# Patient Record
(demographics unavailable — no encounter records)

---

## 2024-10-22 NOTE — HISTORY OF PRESENT ILLNESS
[Born at ___ Wks Gestation] : The patient was born at [unfilled] weeks gestation [] : via normal spontaneous vaginal delivery [[facility]] : at [facility] [BW: _____] : weight of [unfilled] [Length: _____] : length of [unfilled] [HC: _____] : head circumference of [unfilled] [DW: _____] : Discharge weight was [unfilled] [Age: ___] : [unfilled] year old mother [G: ___] : G [unfilled] [P: ___] : P [unfilled] [Yes] : Yes [Breast milk] : breast milk [Hepatitis B Vaccine Given] : Hepatitis B vaccine given [Nirsevimab Given] : Nirsevimab given  [(1) _____] : [unfilled] [(5) _____] : [unfilled] [Meconium] : meconium [Rubella (Immune)] : Rubella immune [MBT: ____] : MBT - [unfilled] [No] : No cigarette smoke exposure [Carbon Monoxide Detectors] : Carbon monoxide detectors at home [Smoke Detectors] : Smoke detectors at home. [HepBsAG] : HepBsAg negative [HIV] : HIV negative [HepC] : Hepatitis C negative [GBS] : GBS negative [VDRL/RPR (Reactive)] : VDRL/RPR nonreactive [] : negative [FreeTextEntry8] : RIGHT HIP CLICK ON EXAM [Exposure to electronic nicotine delivery system] : No exposure to electronic nicotine delivery system [FreeTextEntry7] : Mother Tea Age 33 y/o () Father Homer Age 34 y/o (Real estate) [FreeTextEntry9] : HOME [de-identified] : Hep B 10/18/2024 Nirsevimab 10/20/2024

## 2024-10-22 NOTE — HISTORY OF PRESENT ILLNESS
[Born at ___ Wks Gestation] : The patient was born at [unfilled] weeks gestation [] : via normal spontaneous vaginal delivery [[facility]] : at [facility] [BW: _____] : weight of [unfilled] [Length: _____] : length of [unfilled] [HC: _____] : head circumference of [unfilled] [DW: _____] : Discharge weight was [unfilled] [Age: ___] : [unfilled] year old mother [G: ___] : G [unfilled] [P: ___] : P [unfilled] [Yes] : Yes [Breast milk] : breast milk [Hepatitis B Vaccine Given] : Hepatitis B vaccine given [Nirsevimab Given] : Nirsevimab given  [(1) _____] : [unfilled] [(5) _____] : [unfilled] [Meconium] : meconium [Rubella (Immune)] : Rubella immune [MBT: ____] : MBT - [unfilled] [No] : No cigarette smoke exposure [Carbon Monoxide Detectors] : Carbon monoxide detectors at home [Smoke Detectors] : Smoke detectors at home. [HepBsAG] : HepBsAg negative [HIV] : HIV negative [HepC] : Hepatitis C negative [GBS] : GBS negative [VDRL/RPR (Reactive)] : VDRL/RPR nonreactive [] : negative [FreeTextEntry8] : RIGHT HIP CLICK ON EXAM [Exposure to electronic nicotine delivery system] : No exposure to electronic nicotine delivery system [FreeTextEntry7] : Mother Tea Age 33 y/o () Father Homer Age 36 y/o (Real estate) [FreeTextEntry9] : HOME [de-identified] : Hep B 10/18/2024 Nirsevimab 10/20/2024

## 2024-10-22 NOTE — DISCUSSION/SUMMARY
[Normal Growth] : growth [Normal Development] : developmental [No Elimination Concerns] : elimination [Continue Regimen] : feeding [No Skin Concerns] : skin [Normal Sleep Pattern] : sleep [None] : no known medical problems [Anticipatory Guidance Given] : Anticipatory guidance addressed as per the history of present illness section [ Transition] :  transition [ Care] :  care [Nutritional Adequacy] : nutritional adequacy [Parental Well-Being] : parental well-being [Safety] : safety [No Vaccines] : no vaccines needed [No Medications] : ~He/She~ is not on any medications [Parent/Guardian] : Parent/Guardian [Hepatitis B In Hospital] : Hepatitis B administered while in the hospital [Add Food/Vitamin] : add ~M [Vitamin D] : vitamin D [FreeTextEntry3] : NO HIP CLICK APPRECIATED TODAY BUT WILL REFER FOR SONO BASED ON HISTORY

## 2024-10-22 NOTE — PHYSICAL EXAM
[Alert] : alert [Normocephalic] : normocephalic [Flat Open Anterior [place]] : flat open anterior fontanelle [PERRL] : PERRL [Red Reflex Bilateral] : red reflex bilateral [Normally Placed Ears] : normally placed ears [Auricles Well Formed] : auricles well formed [Clear Tympanic membranes] : clear tympanic membranes [Light reflex present] : light reflex present [Bony structures visible] : bony structures visible [Patent Auditory Canal] : patent auditory canal [Nares Patent] : nares patent [Palate Intact] : palate intact [Uvula Midline] : uvula midline [Supple, full passive range of motion] : supple, full passive range of motion [Symmetric Chest Rise] : symmetric chest rise [Clear to Auscultation Bilaterally] : clear to auscultation bilaterally [Regular Rate and Rhythm] : regular rate and rhythm [S1, S2 present] : S1, S2 present [+2 Femoral Pulses] : +2 femoral pulses [Soft] : soft [Bowel Sounds] : bowel sounds present [Umbilical Stump Dry, Clean, Intact] : umbilical stump dry, clean, intact [Normal external genitalia] : normal external genitalia [Patent Vagina] : patent vagina [Patent] : patent [Normally Placed] : normally placed [No Abnormal Lymph Nodes Palpated] : no abnormal lymph nodes palpated [Symmetric Flexed Extremities] : symmetric flexed extremities [Startle Reflex] : startle reflex present [Suck Reflex] : suck reflex present [Rooting] : rooting reflex present [Palmar Grasp] : palmar grasp present [Plantar Grasp] : plantar reflex present [Symmetric Arnaldo] : symmetric [place] [Acute Distress] : no acute distress [Icteric sclera] : nonicteric sclera [Discharge] : no discharge [Palpable Masses] : no palpable masses [Murmurs] : no murmurs [Tender] : nontender [Distended] : not distended [Hepatomegaly] : no hepatomegaly [Splenomegaly] : no splenomegaly [Clitoromegaly] : no clitoromegaly [Machado-Ortolani] : negative Machado-Ortolani [Spinal Dimple] : no spinal dimple [Tuft of Hair] : no tuft of hair [Jaundice] : not jaundice

## 2024-10-22 NOTE — PHYSICAL EXAM
[Alert] : alert [Normocephalic] : normocephalic [Flat Open Anterior [place]] : flat open anterior fontanelle [PERRL] : PERRL [Red Reflex Bilateral] : red reflex bilateral [Normally Placed Ears] : normally placed ears [Auricles Well Formed] : auricles well formed [Clear Tympanic membranes] : clear tympanic membranes [Light reflex present] : light reflex present [Bony structures visible] : bony structures visible [Patent Auditory Canal] : patent auditory canal [Nares Patent] : nares patent [Palate Intact] : palate intact [Uvula Midline] : uvula midline [Supple, full passive range of motion] : supple, full passive range of motion [Symmetric Chest Rise] : symmetric chest rise [Clear to Auscultation Bilaterally] : clear to auscultation bilaterally [Regular Rate and Rhythm] : regular rate and rhythm [S1, S2 present] : S1, S2 present [+2 Femoral Pulses] : +2 femoral pulses [Soft] : soft [Bowel Sounds] : bowel sounds present [Umbilical Stump Dry, Clean, Intact] : umbilical stump dry, clean, intact [Normal external genitalia] : normal external genitalia [Patent Vagina] : patent vagina [Patent] : patent [Normally Placed] : normally placed [No Abnormal Lymph Nodes Palpated] : no abnormal lymph nodes palpated [Symmetric Flexed Extremities] : symmetric flexed extremities [Startle Reflex] : startle reflex present [Suck Reflex] : suck reflex present [Rooting] : rooting reflex present [Palmar Grasp] : palmar grasp present [Plantar Grasp] : plantar reflex present [Symmetric Arnaldo] : symmetric [place] [Acute Distress] : no acute distress [Icteric sclera] : nonicteric sclera [Discharge] : no discharge [Palpable Masses] : no palpable masses [Murmurs] : no murmurs [Tender] : nontender [Distended] : not distended [Hepatomegaly] : no hepatomegaly [Splenomegaly] : no splenomegaly [Clitoromegaly] : no clitoromegaly [Machado-Ortolani] : negative Machado-Ortolani [Spinal Dimple] : no spinal dimple [Tuft of Hair] : no tuft of hair [Jaundice] : not jaundice

## 2024-11-26 NOTE — PHYSICAL EXAM

## 2024-11-26 NOTE — HISTORY OF PRESENT ILLNESS
[Mother] : mother [Father] : father [Breast milk] : breast milk [Normal] : Normal [In Bassinet/Crib] : sleeps in bassinet/crib [On back] : sleeps on back [Rear facing car seat in back seat] : Rear facing car seat in back seat [Carbon Monoxide Detectors] : Carbon monoxide detectors at home [Smoke Detectors] : Smoke detectors at home. [de-identified] : Breastfeeding

## 2024-11-26 NOTE — PHYSICAL EXAM

## 2024-11-26 NOTE — HISTORY OF PRESENT ILLNESS
[Mother] : mother [Father] : father [Breast milk] : breast milk [Normal] : Normal [In Bassinet/Crib] : sleeps in bassinet/crib [On back] : sleeps on back [Rear facing car seat in back seat] : Rear facing car seat in back seat [Carbon Monoxide Detectors] : Carbon monoxide detectors at home [Smoke Detectors] : Smoke detectors at home. [de-identified] : Breastfeeding

## 2024-11-26 NOTE — DISCUSSION/SUMMARY
[Parental Well-Being] : parental well-being [Family Adjustment] : family adjustment [Feeding Routines] : feeding routines [Infant Adjustment] : infant adjustment [Safety] : safety [Mother] : mother [FreeTextEntry1] :  1 month old female here for WCC.   Hip Click @ Birth - not appreciated today - previous u/s with unclear findings, recommended Repeat which family plans to schedule  WCC - Appropriate growth & Development for age - continue ad inder feeds, return for feeding intolerance - continue monitoring elimination, minimum 4 voids per 24hrs - continue safe sleep practice - alone, on back and in crib/bassinet. No toys, stuffed, animals, heavy blankets or bumpers - encouraged tummy time to improve head control when awake - advised appropriate car seat placement - Reviewed anticipatory guidance regarding fever - Hep B given today - Return for 2 month WCC

## 2024-12-26 NOTE — END OF VISIT
[FreeTextEntry3] : I, Ryan Nicholas MD, I personally performed the services described in the documentation, reviewed the documentation recorded by the scribe in my presence and it accurately and completely records my words and actions [Time Spent: ___ minutes] : I have spent [unfilled] minutes of time on the encounter which excludes teaching and separately reported services.

## 2024-12-26 NOTE — DATA REVIEWED
[de-identified] : US of hips at [facility] 12/26/24 FINDINGS: The left femoral head is approximately 47% covered by the bony acetabulum. The left alpha angle measures 60 degrees. The right femoral head is only approximately 20% covered by the bony acetabulum. The right acetabulum is shallow and the right alpha angle measures 46 degrees.  IMPRESSION: Bilateral hip dysplasia, right more severely affected than left. Orthopedic surgery evaluation is advised.  US of hips at [facility] 11/01/24 FINDINGS: The right alpha angle measures 55 degrees and the right capital femoral epiphysis is approximately 28  % covered by the bony acetabulum. The left alpha angle measures 58 degrees and the left capital femoral epiphysis is approximately 45 % covered by the bony acetabulum. The lateral margins of both acetabula are mildly rounded and there is absence of the normal concave contour.  IMPRESSION: Mildly immature appearing hips likely related to the patient's young age.  A follow-up ultrasound in 4 weeks is recommended to document normal evolution of these hips and completely exclude dysplasia.

## 2024-12-26 NOTE — ASSESSMENT
[FreeTextEntry1] : 2 month old baby girl with right hip dislocation/DDH  The history was obtained today from the child and parent; given the patient's age and/or the child's mental capacity, the history was unreliable and the parent was used as an independent historian.   Miya is the first born child for this family.  There is family history for DDH with mother having been treated for it when she was younger.  On exam today, she has a positive Ortolani.  I explained to family that ultrasound results both on November 1 and December 12 show right hip dislocation with less than 25% coverage of the hip shown.  The left hip is near normal with an alpha angle of 59.9 degrees and 47% coverage.  Recommendation at this time would be to start treatment for DDH/hip dislocation.  Treatment consists of initiating a [place] harness which is a brace specifically designed to treat hip dysplasia.  The braces to be worn at least 23 hours each day full-time.  Our brace specialist met with family today to provide the harness and fit the child appropriately.  We had the family demonstrate taking the brace on and off to ensure that they understand proper brace fit.  With bracing, there is a small risk for possible femoral nerve palsy.  If the family notices any decreased motion or extension of the lower leg when brace is removed for bathing they should contact our office immediately for possible instruction to discontinue brace for short period of time.  I would like to see her back in office in 3 weeks after repeat ultrasound to evaluate both her progress as well as the brace fit.  My office will reach out to help secure an ultrasound appointment and make her follow-up appointment in our office.  Follow-up for ultrasound results and brace check in 3 weeks. This plan was discussed with family and all questions and concerns were addressed today.  I, Joana Marquez PA-C, have acted as a scribe and documented the above for Dr. Nicholas  This note was generated using Dragon medical dictation software. A reasonable effort has been made for proofreading its contents, but typos may still remain. If there are any questions or points of clarification needed please do not hesitate to contact my office.

## 2024-12-26 NOTE — HISTORY OF PRESENT ILLNESS
[FreeTextEntry1] : Miya is a 2-month-old baby girl who is brought in today by her parents for pediatric orthopedic evaluation regarding her hips.  Mother states that she was born at 39 weeks gestation via normal spontaneous vaginal delivery, weighing 8 pounds 2 ounces.  She is the first born child for this family.  Mother states that she herself had hip dysplasia as an infant and required bracing.  Upon birth, a hip click on the right side was noted and the child was sent for an ultrasound.  The ultrasound was initially performed on November 1 when the baby was a few days old.  The parents were told that right hip dysplasia was noted but it could be due to the patient's young age/immaturity of the hip and was recommended to repeat an ultrasound a few weeks later.  A repeat ultrasound was performed on December 12, 2024 which again demonstrated hip dysplasia, this time in both hips.  They were recommended for pediatric orthopedic evaluation.

## 2024-12-26 NOTE — REASON FOR VISIT
[Consultation] : a consultation visit [Parents] : parents [FreeTextEntry1] : Right  hip congenital dislocation

## 2024-12-26 NOTE — PHYSICAL EXAM
[FreeTextEntry1] : Well-developed, well-nourished 2 month F  in no acute distress.  She  is awake and alert and appears to be resting comfortably. She  cries appropriately.  The head is normocephalic, atraumatic with full range of motion of the cervical spine with no pain.  Eyes are clear with no sclera abnormalities. Ears, nose and mouth are clear.   The child is moving all limbs spontaneously.  Full range of motion of bilateral upper extremities.  The motor exam is 5/5 of bilateral shoulders, elbows, wrists, and hands.  The pulses are 2+ at both wrists.   The child has full range of motion of bilateral hips, knees with motor exam of 5/5 of both lower extremities. Positive Ortolani, Negative Galeazzi No apparent limb length discrepancy.  Sensation is grossly intact in bilateral upper and lower extremities. Pulses are 2+ at both feet.

## 2025-01-03 NOTE — PHYSICAL EXAM
[Alert] : alert [Acute Distress] : no acute distress [Normocephalic] : normocephalic [Flat Open Anterior [place]] : flat open anterior fontanelle [PERRL] : PERRL [Red Reflex Bilateral] : red reflex bilateral [Normally Placed Ears] : normally placed ears [Auricles Well Formed] : auricles well formed [Clear Tympanic membranes] : clear tympanic membranes [Light reflex present] : light reflex present [Bony landmarks visible] : bony landmarks visible [Discharge] : no discharge [Nares Patent] : nares patent [Palate Intact] : palate intact [Uvula Midline] : uvula midline [Supple, full passive range of motion] : supple, full passive range of motion [Palpable Masses] : no palpable masses [Symmetric Chest Rise] : symmetric chest rise [Clear to Auscultation Bilaterally] : clear to auscultation bilaterally [Regular Rate and Rhythm] : regular rate and rhythm [S1, S2 present] : S1, S2 present [Murmurs] : no murmurs [+2 Femoral Pulses] : +2 femoral pulses [Soft] : soft [Tender] : nontender [Distended] : not distended [Bowel Sounds] : bowel sounds present [Hepatomegaly] : no hepatomegaly [Splenomegaly] : no splenomegaly [Normal external genitailia] : normal external genitalia [Clitoromegaly] : no clitoromegaly [Patent Vagina] : vagina patent [Normally Placed] : normally placed [No Abnormal Lymph Nodes Palpated] : no abnormal lymph nodes palpated [Spinal Dimple] : no spinal dimple [Tuft of Hair] : no tuft of hair [Startle Reflex] : startle reflex present [Suck Reflex] : suck reflex present [Rooting] : rooting reflex present [Palmar Grasp] : palmar grasp reflex present [Plantar Grasp] : plantar grasp reflex present [Symmetric Arnaldo] : symmetric [place] [Rash and/or lesion present] : no rash/lesion [de-identified] : In [place] Harness

## 2025-01-03 NOTE — HISTORY OF PRESENT ILLNESS
[Parents] : parents [Breast milk] : breast milk [Normal] : Normal [In Bassinet/Crib] : sleeps in bassinet/crib [On back] : sleeps on back [No] : No cigarette smoke exposure [Rear facing car seat in back seat] : Rear facing car seat in back seat [Carbon Monoxide Detectors] : Carbon monoxide detectors at home [Smoke Detectors] : Smoke detectors at home. [NO] : No [de-identified] : Breastfeeding.  [FreeTextEntry9] : home

## 2025-01-03 NOTE — HISTORY OF PRESENT ILLNESS
[Parents] : parents [Breast milk] : breast milk [Normal] : Normal [In Bassinet/Crib] : sleeps in bassinet/crib [On back] : sleeps on back [No] : No cigarette smoke exposure [Rear facing car seat in back seat] : Rear facing car seat in back seat [Carbon Monoxide Detectors] : Carbon monoxide detectors at home [Smoke Detectors] : Smoke detectors at home. [NO] : No [de-identified] : Breastfeeding.  [FreeTextEntry9] : home

## 2025-01-03 NOTE — PHYSICAL EXAM
[Alert] : alert [Acute Distress] : no acute distress [Normocephalic] : normocephalic [Flat Open Anterior [place]] : flat open anterior fontanelle [PERRL] : PERRL [Red Reflex Bilateral] : red reflex bilateral [Normally Placed Ears] : normally placed ears [Auricles Well Formed] : auricles well formed [Clear Tympanic membranes] : clear tympanic membranes [Light reflex present] : light reflex present [Bony landmarks visible] : bony landmarks visible [Discharge] : no discharge [Nares Patent] : nares patent [Palate Intact] : palate intact [Uvula Midline] : uvula midline [Supple, full passive range of motion] : supple, full passive range of motion [Palpable Masses] : no palpable masses [Symmetric Chest Rise] : symmetric chest rise [Clear to Auscultation Bilaterally] : clear to auscultation bilaterally [Regular Rate and Rhythm] : regular rate and rhythm [S1, S2 present] : S1, S2 present [Murmurs] : no murmurs [+2 Femoral Pulses] : +2 femoral pulses [Soft] : soft [Tender] : nontender [Distended] : not distended [Bowel Sounds] : bowel sounds present [Hepatomegaly] : no hepatomegaly [Splenomegaly] : no splenomegaly [Normal external genitailia] : normal external genitalia [Clitoromegaly] : no clitoromegaly [Patent Vagina] : vagina patent [Normally Placed] : normally placed [No Abnormal Lymph Nodes Palpated] : no abnormal lymph nodes palpated [Spinal Dimple] : no spinal dimple [Tuft of Hair] : no tuft of hair [Startle Reflex] : startle reflex present [Suck Reflex] : suck reflex present [Rooting] : rooting reflex present [Palmar Grasp] : palmar grasp reflex present [Plantar Grasp] : plantar grasp reflex present [Symmetric Arnaldo] : symmetric [place] [Rash and/or lesion present] : no rash/lesion [de-identified] : In [place] Harness

## 2025-01-03 NOTE — DISCUSSION/SUMMARY
[FreeTextEntry1] :  2 month old female here for WCC  DDH - Ortho f/u as scheduled  WCC - Appropriate growth & Development for age - continue ad inder feeds, return for feeding intolerance - continue safe sleep practice - alone, on back and in crib/bassinet. No toys, stuffed, animals, heavy blankets or bumpers - encouraged tummy time to improve head control when awake - Reviewed anticipatory guidance re: fevers, car seat safety - Vaccines given: Rotavirus, Pentacel & Prevnar - Return for routine 4mo WCC

## 2025-01-16 NOTE — ASSESSMENT
[FreeTextEntry1] : 2-month-old baby girl with right hip dislocation/DDH, currently being treated with [place] harness.  The history was obtained today from the child and parent; given the patient's age and/or the child's mental capacity, the history was unreliable, and the parent was used as an independent historian. US of hips:1/16/2025 the left femoral head is approximately 50% covered by the bony acetabulum. The left alpha angle measures 60 degrees. The right femoral head is approximately 30% covered by the bony acetabulum. The right alpha angle measures 47 degrees. Based on her US reports today she will continue wearing [place] harness full time at this time. We had the family demonstrate taking the brace on and off to ensure that they understand proper brace fit.  With bracing, there is a small risk for possible femoral nerve palsy.  If the family notices any decreased motion or extension of the lower leg when brace is removed for bathing, they should contact our office immediately for possible instruction to discontinue brace for short period of time.  I would like to see her back in office in 3 weeks after repeat ultrasound to evaluate both her progress as well as the brace fit.  Follow-up for ultrasound results and brace check in 3 weeks. This plan was discussed with family and all questions and concerns were addressed today.  I, Lanette Al, have acted as a scribe and documented the above for Dr. Nicholas.

## 2025-01-16 NOTE — END OF VISIT
[FreeTextEntry3] : I, Ryan Nicholas MD, I personally performed the services described in the documentation, reviewed the documentation recorded by the scribe in my presence and it accurately and completely records my words and actions

## 2025-01-16 NOTE — PHYSICAL EXAM
[FreeTextEntry1] : Well-developed, well-nourished 2 month F  in no acute distress.  She  is awake and alert and appears to be resting comfortably. She  cries appropriately.  The head is normocephalic, atraumatic with full range of motion of the cervical spine with no pain.  Eyes are clear with no sclera abnormalities. Ears, nose and mouth are clear.   The child is moving all limbs spontaneously.  Full range of motion of bilateral upper extremities.  The motor exam is 5/5 of bilateral shoulders, elbows, wrists, and hands.  The pulses are 2+ at both wrists.   The child has full range of motion of bilateral hips, knees with motor exam of 5/5 of both lower extremities. Negative  Ortolani previously +, Negative Galeazzi No apparent limb length discrepancy.  Sensation is grossly intact in bilateral upper and lower extremities. Pulses are 2+ at both feet.

## 2025-01-16 NOTE — REASON FOR VISIT
[Follow Up] : a follow up visit [Parents] : parents [FreeTextEntry1] : Right  hip congenital dislocation

## 2025-01-16 NOTE — HISTORY OF PRESENT ILLNESS
[FreeTextEntry1] : Miya is a 2-month-old baby girl who is brought in today by her parents for pediatric orthopedic evaluation regarding her hips. She was born at 39 weeks' gestation via normal spontaneous vaginal delivery, weighing 8 pounds 2 ounces.  She is the first born child for this family. Upon birth, a hip click on the right side was noted and the child was sent for an ultrasound.  The ultrasound was initially performed on November 1 when the baby was a few days old.  The parents were told that right hip dysplasia was noted but it could be due to the patient's young age/immaturity of the hip and was recommended to repeat an ultrasound a few weeks later.  A repeat ultrasound was performed on December 12, 2024, which again demonstrated hip dysplasia, this time in both hips.  They were recommended for pediatric orthopedic evaluation.   She was initially seen on 12/26/2024 and she was placed in a [place] harness. Today father reports that she is tolerating the harness well. Denies any discomfort or pain. She is kicking her both lower extremities spontaneously. Here for further orthopedic evaluation. Of note mother had hip dysplasia as an infant and required bracing.

## 2025-01-16 NOTE — BIRTH HISTORY
[Duration: ___ wks] : duration: [unfilled] weeks [Vaginal] : Vaginal [Normal?] : normal delivery [___ lbs.] : [unfilled] lbs [___ oz.] : [unfilled] oz. [Was child in NICU?] : Child was not in NICU 97

## 2025-01-16 NOTE — DATA REVIEWED
[de-identified] : US of hips:1/16/2025 The left femoral head is approximately 50% covered by the bony acetabulum. The left alpha angle measures 60 degrees. The right femoral head is approximately 30% covered by the bony acetabulum. The right alpha angle measures 47 degrees.  US of hips at [facility] 12/26/24 FINDINGS: The left femoral head is approximately 47% covered by the bony acetabulum. The left alpha angle measures 60 degrees. The right femoral head is only approximately 20% covered by the bony acetabulum. The right acetabulum is shallow and the right alpha angle measures 46 degrees.  IMPRESSION: Bilateral hip dysplasia, right more severely affected than left. Orthopedic surgery evaluation is advised.  US of hips at [facility] 11/01/24 FINDINGS: The right alpha angle measures 55 degrees and the right capital femoral epiphysis is approximately 28  % covered by the bony acetabulum. The left alpha angle measures 58 degrees and the left capital femoral epiphysis is approximately 45 % covered by the bony acetabulum. The lateral margins of both acetabula are mildly rounded and there is absence of the normal concave contour.  IMPRESSION: Mildly immature appearing hips likely related to the patient's young age.  A follow-up ultrasound in 4 weeks is recommended to document normal evolution of these hips and completely exclude dysplasia.

## 2025-02-13 NOTE — DATA REVIEWED
[de-identified] : Ultrasound of both hips 2/13/25: Alpha angle 61 degrees, right alpha angle 32 degrees.  Positive improvement.  US of hips:1/16/2025 The left femoral head is approximately 50% covered by the bony acetabulum. The left alpha angle measures 60 degrees. The right femoral head is approximately 30% covered by the bony acetabulum. The right alpha angle measures 47 degrees.  US of hips at [facility] 12/26/24 FINDINGS: The left femoral head is approximately 47% covered by the bony acetabulum. The left alpha angle measures 60 degrees. The right femoral head is only approximately 20% covered by the bony acetabulum. The right acetabulum is shallow and the right alpha angle measures 46 degrees.  IMPRESSION: Bilateral hip dysplasia, right more severely affected than left. Orthopedic surgery evaluation is advised.  US of hips at [facility] 11/01/24 FINDINGS: The right alpha angle measures 55 degrees and the right capital femoral epiphysis is approximately 28  % covered by the bony acetabulum. The left alpha angle measures 58 degrees and the left capital femoral epiphysis is approximately 45 % covered by the bony acetabulum. The lateral margins of both acetabula are mildly rounded and there is absence of the normal concave contour.  IMPRESSION  mildly immature appearing hips likely related to the patient's young age.  A follow-up ultrasound in 4 weeks is recommended to document normal evolution of these hips and completely exclude dysplasia.

## 2025-02-13 NOTE — ASSESSMENT
[FreeTextEntry1] : 3-month-old baby girl with right hip dislocation/DDH, currently being treated with [place] harness. Today's assessment was performed with the assistance of the patient's parent as an independent historian as the patient's history is unreliable.  The bilateral US obtained from the outside facility were reviewed with both the parent and patient confirming resolving DDH.  The recommendation at this time would be to continue the [place] harness.  The harness was checked and is fitting appropriately.  She will undergo a bilateral hip ultrasound in 3 weeks prior to the next visit.  We had a thorough talk in regard to the diagnosis, prognosis and treatment modalities.  All questions and concerns were addressed today. There was a verbal understanding from the parents and patient.   AYUSH Pro have acted as a scribe and documented the above information for Dr. Nicholas.   This note was generated using Dragon medical dictation software. A reasonable effort has been made for proofreading its contents; however, typos may still remain. If there are any questions or points of clarification needed, please do not hesitate to contact my office.   The above documentation completed by the scribe is an accurate record of both my words and actions.   Dr. Nicholas.

## 2025-02-13 NOTE — HISTORY OF PRESENT ILLNESS
[FreeTextEntry1] : Miya is a 2-month-old baby girl who is brought in today by her parents for pediatric orthopedic evaluation regarding her hips. She was born at 39 weeks' gestation via normal spontaneous vaginal delivery, weighing 8 pounds 2 ounces.  She is the first born child for this family. Upon birth, a hip click on the right side was noted and the child was sent for an ultrasound.  The ultrasound was initially performed on November 1 when the baby was a few days old.  The parents were told that right hip dysplasia was noted but it could be due to the patient's young age/immaturity of the hip and was recommended to repeat an ultrasound a few weeks later.  A repeat ultrasound was performed on December 12, 2024, which again demonstrated hip dysplasia, this time in both hips.  They were recommended for pediatric orthopedic evaluation.   She was initially seen on 12/26/2024 and she was placed in a [place] harness. Today father reports that she is tolerating the harness well. Denies any discomfort or pain. She is kicking her both lower extremities spontaneously. Here for further orthopedic evaluation. Of note mother had hip dysplasia as an infant and required bracing.  Please refer to last note from previous treatment and further details.  Today, Miya is a 3-month-old girl who presents today for bilateral hip dysplasia.  She is currently wearing her [place] harness full-time.  She underwent a bilateral hip ultrasound.  She presents today for repeat exam and to review the ultrasound results.

## 2025-02-26 NOTE — DEVELOPMENTAL MILESTONES
[Normal Development] : Normal Development [None] : none [Laughs aloud] : laughs aloud [Turns to voice] : turns to voice [Vocalizes with extending cooing] : vocalizes with extending cooing [Supports on elbows & wrists in prone] : supports on elbows and wrists in prone [Plays with fingers in midline] : plays with fingers in midline [Grasps objects] : grasps objects

## 2025-02-27 NOTE — PHYSICAL EXAM
[Alert] : alert [Acute Distress] : no acute distress [Normocephalic] : normocephalic [Flat Open Anterior [place]] : flat open anterior fontanelle [Red Reflex] : red reflex bilateral [PERRL] : PERRL [Normally Placed Ears] : normally placed ears [Auricles Well Formed] : auricles well formed [Clear Tympanic membranes] : clear tympanic membranes [Light reflex present] : light reflex present [Bony landmarks visible] : bony landmarks visible [Discharge] : no discharge [Nares Patent] : nares patent [Palate Intact] : palate intact [Uvula Midline] : uvula midline [Palpable Masses] : no palpable masses [Symmetric Chest Rise] : symmetric chest rise [Clear to Auscultation Bilaterally] : clear to auscultation bilaterally [Regular Rate and Rhythm] : regular rate and rhythm [S1, S2 present] : S1, S2 present [Murmurs] : no murmurs [+2 Femoral Pulses] : (+) 2 femoral pulses [Soft] : soft [Tender] : nontender [Distended] : nondistended [Bowel Sounds] : bowel sounds present [Hepatomegaly] : no hepatomegaly [Splenomegaly] : no splenomegaly [Normal External Genitalia] : normal external genitalia [Clitoromegaly] : no clitoromegaly [Normal Vaginal Introitus] : normal vaginal introitus [Patent] : patent [Normally Placed] : normally placed [No Abnormal Lymph Nodes Palpated] : no abnormal lymph nodes palpated [Rash or Lesions] : no rash/lesions [de-identified] : [place] Harness in place

## 2025-02-27 NOTE — DISCUSSION/SUMMARY
[FreeTextEntry1] :  4 month old female here for WCC.   Bilateral Hip Dysplasia - C/w simona hernandez as prescribed - ortho follow up as scheduled  WCC - continue ad inder feeds, return for feeding intolerance  - Counseled on introducing solids and timing in relation to potential harness removal which would allow her to sit up better.  - continue safe sleep practice - alone, on back and in crib/bassinet. No toys, stuffed, animals, heavy blankets or bumpers - encouraged tummy time to improve head control when awake - Reviewed anticipatory guidance re: fevers, car seat safety - Vaccines given: Rotavirus, Pentacel & Prevnar - Return in 2mo for routine 6mo WCC

## 2025-02-27 NOTE — HISTORY OF PRESENT ILLNESS
[Parents] : parents [Breast milk] : breast milk [Normal] : Normal [In Bassinet/Crib] : sleeps in bassinet/crib [On back] : sleeps on back [Rear facing car seat in back seat] : Rear facing car seat in back seat [Carbon Monoxide Detectors] : Carbon monoxide detectors at home [Smoke Detectors] : Smoke detectors at home. [de-identified] : BF and HEM (6-7 oz/feed) [FreeTextEntry9] : Home

## 2025-02-27 NOTE — PHYSICAL EXAM
[Alert] : alert [Acute Distress] : no acute distress [Normocephalic] : normocephalic [Flat Open Anterior [place]] : flat open anterior fontanelle [Red Reflex] : red reflex bilateral [PERRL] : PERRL [Normally Placed Ears] : normally placed ears [Auricles Well Formed] : auricles well formed [Clear Tympanic membranes] : clear tympanic membranes [Light reflex present] : light reflex present [Bony landmarks visible] : bony landmarks visible [Discharge] : no discharge [Nares Patent] : nares patent [Palate Intact] : palate intact [Uvula Midline] : uvula midline [Palpable Masses] : no palpable masses [Symmetric Chest Rise] : symmetric chest rise [Clear to Auscultation Bilaterally] : clear to auscultation bilaterally [Regular Rate and Rhythm] : regular rate and rhythm [S1, S2 present] : S1, S2 present [Murmurs] : no murmurs [+2 Femoral Pulses] : (+) 2 femoral pulses [Soft] : soft [Tender] : nontender [Distended] : nondistended [Bowel Sounds] : bowel sounds present [Hepatomegaly] : no hepatomegaly [Splenomegaly] : no splenomegaly [Normal External Genitalia] : normal external genitalia [Clitoromegaly] : no clitoromegaly [Normal Vaginal Introitus] : normal vaginal introitus [Patent] : patent [Normally Placed] : normally placed [No Abnormal Lymph Nodes Palpated] : no abnormal lymph nodes palpated [Rash or Lesions] : no rash/lesions [de-identified] : [place] Harness in place

## 2025-02-27 NOTE — HISTORY OF PRESENT ILLNESS
[Parents] : parents [Breast milk] : breast milk [Normal] : Normal [In Bassinet/Crib] : sleeps in bassinet/crib [On back] : sleeps on back [Rear facing car seat in back seat] : Rear facing car seat in back seat [Carbon Monoxide Detectors] : Carbon monoxide detectors at home [Smoke Detectors] : Smoke detectors at home. [de-identified] : BF and HEM (6-7 oz/feed) [FreeTextEntry9] : Home

## 2025-03-14 NOTE — ASSESSMENT
[FreeTextEntry1] : 4-month-old baby girl with right hip dislocation/DDH, currently being treated with [place] harness.  Today's assessment was performed with the assistance of the patient's parent as an independent historian as the patient's history is unreliable.    Clinical findings and imaging discussed at length with parents. US hip performed today reviewed at length. Per report, right alpha angle measures 63 degrees and the right capital femoral epiphysis is approximately 66 % covered by the bony acetabulum. The left alpha angle measures 65 degrees and the left capital femoral epiphysis is approximately 61 % covered by the bony acetabulum. Recommendation at this time would be to continue with full time harness for next 3 weeks. It was discussed with parents that if the US shows normalization at follow up visit, we may consider weaning off to night time wear only. She will f/u in 3 weeks for repeat clinical evaluation and US hip result. All questions answered. Family and patient verbalize understanding of the plan.   Brittany PEACOCK PA-C have acted as scribe and documented the above for Dr. Nicholas

## 2025-03-14 NOTE — HISTORY OF PRESENT ILLNESS
[FreeTextEntry1] : Miya is a 4-month-old baby girl who is brought in today by her parents for pediatric orthopedic evaluation regarding her hips. She was born at 39 weeks' gestation via normal spontaneous vaginal delivery, weighing 8 pounds 2 ounces.  She is the first born child for this family. Upon birth, a hip click on the right side was noted and the child was sent for an ultrasound.  The ultrasound was initially performed on November 1 when the baby was a few days old.  The parents were told that right hip dysplasia was noted but it could be due to the patient's young age/immaturity of the hip and was recommended to repeat an ultrasound a few weeks later.  A repeat ultrasound was performed on December 12, 2024, which again demonstrated hip dysplasia, this time in both hips.  They were recommended for pediatric orthopedic evaluation.   She was initially seen on 12/26/2024 and she was placed in a [place] harness. Today father reports that she is tolerating the harness well. Denies any discomfort or pain. She is kicking her both lower extremities spontaneously. Here for further orthopedic evaluation. Of note mother had hip dysplasia as an infant and required bracing.  Please refer to last note from previous treatment and further details.  Today, Miya is a 4-month-old girl who presents today for bilateral hip dysplasia.  She is currently wearing her [place] harness full-time.  She underwent a bilateral hip ultrasound.  She presents today for repeat exam and to review the ultrasound results.

## 2025-03-14 NOTE — DATA REVIEWED
[de-identified] : US hips 3/13/25:  The right alpha angle measures 63 degrees and the right capital femoral epiphysis is approximately 66 % covered by the bony acetabulum.  The left alpha angle measures 65 degrees and the left capital femoral epiphysis is approximately 61 % covered by the bony acetabulum.  US of hips:1/16/2025 The left femoral head is approximately 50% covered by the bony acetabulum. The left alpha angle measures 60 degrees. The right femoral head is approximately 30% covered by the bony acetabulum. The right alpha angle measures 47 degrees.  US of hips at [facility] 12/26/24 FINDINGS: The left femoral head is approximately 47% covered by the bony acetabulum. The left alpha angle measures 60 degrees. The right femoral head is only approximately 20% covered by the bony acetabulum. The right acetabulum is shallow and the right alpha angle measures 46 degrees.  IMPRESSION: Bilateral hip dysplasia, right more severely affected than left. Orthopedic surgery evaluation is advised.  US of hips at [facility] 11/01/24 FINDINGS: The right alpha angle measures 55 degrees and the right capital femoral epiphysis is approximately 28  % covered by the bony acetabulum. The left alpha angle measures 58 degrees and the left capital femoral epiphysis is approximately 45 % covered by the bony acetabulum. The lateral margins of both acetabula are mildly rounded and there is absence of the normal concave contour.  IMPRESSION  mildly immature appearing hips likely related to the patient's young age.  A follow-up ultrasound in 4 weeks is recommended to document normal evolution of these hips and completely exclude dysplasia.

## 2025-03-14 NOTE — PHYSICAL EXAM
[FreeTextEntry1] : Well-developed, well-nourished 4 month F  in no acute distress.  She  is awake and alert and appears to be resting comfortably. She  cries appropriately.  The head is normocephalic, atraumatic with full range of motion of the cervical spine with no pain.  Eyes are clear with no sclera abnormalities. Ears, nose and mouth are clear.   The child is moving all limbs spontaneously.  Full range of motion of bilateral upper extremities.  The motor exam is 5/5 of bilateral shoulders, elbows, wrists, and hands.  The pulses are 2+ at both wrists.   The child has full range of motion of bilateral hips, knees with motor exam of 5/5 of both lower extremities. [place] harness in place fitting well No apparent limb length discrepancy.  Sensation is grossly intact in bilateral upper and lower extremities. Pulses are 2+ at both feet.

## 2025-03-14 NOTE — DATA REVIEWED
[de-identified] : US hips 3/13/25:  The right alpha angle measures 63 degrees and the right capital femoral epiphysis is approximately 66 % covered by the bony acetabulum.  The left alpha angle measures 65 degrees and the left capital femoral epiphysis is approximately 61 % covered by the bony acetabulum.  US of hips:1/16/2025 The left femoral head is approximately 50% covered by the bony acetabulum. The left alpha angle measures 60 degrees. The right femoral head is approximately 30% covered by the bony acetabulum. The right alpha angle measures 47 degrees.  US of hips at [facility] 12/26/24 FINDINGS: The left femoral head is approximately 47% covered by the bony acetabulum. The left alpha angle measures 60 degrees. The right femoral head is only approximately 20% covered by the bony acetabulum. The right acetabulum is shallow and the right alpha angle measures 46 degrees.  IMPRESSION: Bilateral hip dysplasia, right more severely affected than left. Orthopedic surgery evaluation is advised.  US of hips at [facility] 11/01/24 FINDINGS: The right alpha angle measures 55 degrees and the right capital femoral epiphysis is approximately 28  % covered by the bony acetabulum. The left alpha angle measures 58 degrees and the left capital femoral epiphysis is approximately 45 % covered by the bony acetabulum. The lateral margins of both acetabula are mildly rounded and there is absence of the normal concave contour.  IMPRESSION  mildly immature appearing hips likely related to the patient's young age.  A follow-up ultrasound in 4 weeks is recommended to document normal evolution of these hips and completely exclude dysplasia.

## 2025-04-03 NOTE — DATA REVIEWED
[de-identified] : US of both hips were obtained from [facility] on 04/03/2025 and independently reviewed today the right alpha angle measures 67 degrees and the right capital femoral epiphysis is approximately 60 % covered by the bony acetabulum. The left alpha angle measures 65 degrees and the left capital femoral epiphysis is approximately 70 % covered by the bony acetabulum.  US hips 3/13/25:  The right alpha angle measures 63 degrees and the right capital femoral epiphysis is approximately 66 % covered by the bony acetabulum.  The left alpha angle measures 65 degrees and the left capital femoral epiphysis is approximately 61 % covered by the bony acetabulum.  US of hips:1/16/2025 The left femoral head is approximately 50% covered by the bony acetabulum. The left alpha angle measures 60 degrees. The right femoral head is approximately 30% covered by the bony acetabulum. The right alpha angle measures 47 degrees.  US of hips at [facility] 12/26/24 FINDINGS: The left femoral head is approximately 47% covered by the bony acetabulum. The left alpha angle measures 60 degrees. The right femoral head is only approximately 20% covered by the bony acetabulum. The right acetabulum is shallow and the right alpha angle measures 46 degrees.  IMPRESSION: Bilateral hip dysplasia, right more severely affected than left. Orthopedic surgery evaluation is advised.  US of hips at [facility] 11/01/24 FINDINGS: The right alpha angle measures 55 degrees and the right capital femoral epiphysis is approximately 28  % covered by the bony acetabulum. The left alpha angle measures 58 degrees and the left capital femoral epiphysis is approximately 45 % covered by the bony acetabulum. The lateral margins of both acetabula are mildly rounded and there is absence of the normal concave contour.  IMPRESSION  mildly immature appearing hips likely related to the patient's young age.  A follow-up ultrasound in 4 weeks is recommended to document normal evolution of these hips and completely exclude dysplasia.

## 2025-04-03 NOTE — ASSESSMENT
[FreeTextEntry1] : 5-month-old baby girl with right hip dislocation/DDH, currently being treated with [place] harness.  Today's assessment was performed with the assistance of the patient's parent as an independent historian as the patient's history is unreliable. Clinical findings and imaging discussed at length with parents. US of both hips were obtained from [facility] on 04/03/2025 and independently reviewed today the right alpha angle measures 67 degrees and the right capital femoral epiphysis is approximately 60 % covered by the bony acetabulum. The left alpha angle measures 65 degrees and the left capital femoral epiphysis is approximately 70 % covered by the bony acetabulum. Based on her US findings recommendation at this time would be to discontinue [place] harness. She will f/u in 4 weeks for AP/Frog Pelvis x-rays and repeat clinical evaluation.   All questions answered. Family verbalizes understanding of the plan.   ILanette, have acted as scribe and documented the above for Dr. Nicholas

## 2025-04-03 NOTE — ASSESSMENT
[FreeTextEntry1] : 5-month-old baby girl with right hip dislocation/DDH, currently being treated with [place] harness.  Today's assessment was performed with the assistance of the patient's parent as an independent historian as the patient's history is unreliable. Clinical findings and imaging discussed at length with parents. US of both hips were obtained from [facility] on 04/03/2025 and independently reviewed today the right alpha angle measures 67 degrees and the right capital femoral epiphysis is approximately 60 % covered by the bony acetabulum. The left alpha angle measures 65 degrees and the left capital femoral epiphysis is approximately 70 % covered by the bony acetabulum. Based on her US findings recommendation at this time would be to discontinue [place] harness. She will f/u in 4 weeks for AP/Frog Pelvis x-rays and repeat clinical evaluation.   All questions answered. Family verbalizes understanding of the plan.   ILanette, have acted as scribe and documented the above for Dr. Nicholas

## 2025-04-03 NOTE — HISTORY OF PRESENT ILLNESS
[FreeTextEntry1] : Miya is a 5-month-old baby girl who is brought in today by her parents for pediatric orthopedic evaluation regarding her hips. She was born at 39 weeks' gestation via normal spontaneous vaginal delivery, weighing 8 pounds 2 ounces. Upon birth, a hip click on the right side was noted and the child was sent for an ultrasound.  The ultrasound was initially performed on November 1 when the baby was a few days old.  The parents were told that right hip dysplasia was noted but it could be due to the patient's young age/immaturity of the hip and was recommended to repeat an ultrasound a few weeks later.  A repeat ultrasound was performed on December 12, 2024, which again demonstrated hip dysplasia, this time in both hips.  They were recommended for pediatric orthopedic evaluation. She was initially seen on 12/26/2024 and she was placed in a [place] harness.   Today father reports that she is tolerating the harness well. She is currently wearing her [place] harness full-time. Denies any discomfort or pain. She is kicking her both lower extremities spontaneously. She is the first-born child for this family. She underwent a bilateral hip ultrasound.  She presents today for repeat exam and to review the ultrasound results. Of note mother had hip dysplasia as an infant and required bracing.

## 2025-04-03 NOTE — DATA REVIEWED
[de-identified] : US of both hips were obtained from [facility] on 04/03/2025 and independently reviewed today the right alpha angle measures 67 degrees and the right capital femoral epiphysis is approximately 60 % covered by the bony acetabulum. The left alpha angle measures 65 degrees and the left capital femoral epiphysis is approximately 70 % covered by the bony acetabulum.  US hips 3/13/25:  The right alpha angle measures 63 degrees and the right capital femoral epiphysis is approximately 66 % covered by the bony acetabulum.  The left alpha angle measures 65 degrees and the left capital femoral epiphysis is approximately 61 % covered by the bony acetabulum.  US of hips:1/16/2025 The left femoral head is approximately 50% covered by the bony acetabulum. The left alpha angle measures 60 degrees. The right femoral head is approximately 30% covered by the bony acetabulum. The right alpha angle measures 47 degrees.  US of hips at [facility] 12/26/24 FINDINGS: The left femoral head is approximately 47% covered by the bony acetabulum. The left alpha angle measures 60 degrees. The right femoral head is only approximately 20% covered by the bony acetabulum. The right acetabulum is shallow and the right alpha angle measures 46 degrees.  IMPRESSION: Bilateral hip dysplasia, right more severely affected than left. Orthopedic surgery evaluation is advised.  US of hips at [facility] 11/01/24 FINDINGS: The right alpha angle measures 55 degrees and the right capital femoral epiphysis is approximately 28  % covered by the bony acetabulum. The left alpha angle measures 58 degrees and the left capital femoral epiphysis is approximately 45 % covered by the bony acetabulum. The lateral margins of both acetabula are mildly rounded and there is absence of the normal concave contour.  IMPRESSION  mildly immature appearing hips likely related to the patient's young age.  A follow-up ultrasound in 4 weeks is recommended to document normal evolution of these hips and completely exclude dysplasia.

## 2025-04-03 NOTE — PHYSICAL EXAM
[FreeTextEntry1] : Well-developed, well-nourished 5-month-old F in no acute distress.  She is awake and alert and appears to be resting comfortably. She cries appropriately.  The head is normocephalic, atraumatic with full range of motion of the cervical spine with no pain.  Eyes are clear with no sclera abnormalities. Ears, nose and mouth are clear.   The child is moving all limbs spontaneously.  Full range of motion of bilateral upper extremities.  The motor exam is 5/5 of bilateral shoulders, elbows, wrists, and hands.  The pulses are 2+ at both wrists.   The child has full range of motion of bilateral hips, knees with motor exam of 5/5 of both lower extremities. [place] harness in place fitting well No apparent limb length discrepancy.  Sensation is grossly intact in bilateral upper and lower extremities. Pulses are 2+ at both feet.

## 2025-04-28 NOTE — HISTORY OF PRESENT ILLNESS
[Parents] : parents [Breast milk] : breast milk [Normal] : Normal [In Bassinet/Crib] : sleeps in bassinet/crib [No] : No cigarette smoke exposure [Rear facing car seat in back seat] : Rear facing car seat in back seat [Carbon Monoxide Detectors] : Carbon monoxide detectors at home [Smoke Detectors] : Smoke detectors at home. [de-identified] : Breastfeeding...started purees - fruits/veggies. eating 1x/day.  [de-identified] : mostly throughout the night.

## 2025-04-28 NOTE — PHYSICAL EXAM
[Alert] : alert [Acute Distress] : no acute distress [Normocephalic] : normocephalic [Flat Open Anterior Ponderosa] : flat open anterior fontanelle [Red Reflex] : red reflex bilateral [PERRL] : PERRL [Normally Placed Ears] : normally placed ears [Auricles Well Formed] : auricles well formed [Clear Tympanic membranes] : clear tympanic membranes [Light reflex present] : light reflex present [Bony landmarks visible] : bony landmarks visible [Discharge] : no discharge [Nares Patent] : nares patent [Palate Intact] : palate intact [Uvula Midline] : uvula midline [Tooth Eruption] : no tooth eruption [Supple, full passive range of motion] : supple, full passive range of motion [Palpable Masses] : no palpable masses [Symmetric Chest Rise] : symmetric chest rise [Clear to Auscultation Bilaterally] : clear to auscultation bilaterally [Regular Rate and Rhythm] : regular rate and rhythm [S1, S2 present] : S1, S2 present [Murmurs] : no murmurs [+2 Femoral Pulses] : (+) 2 femoral pulses [Soft] : soft [Tender] : nontender [Distended] : nondistended [Bowel Sounds] : bowel sounds present [Hepatomegaly] : no hepatomegaly [Splenomegaly] : no splenomegaly [Normal External Genitalia] : normal external genitalia [Clitoromegaly] : no clitoromegaly [Normal Vaginal Introitus] : normal vaginal introitus [Patent] : patent [Normally Placed] : normally placed [No Abnormal Lymph Nodes Palpated] : no abnormal lymph nodes palpated [Machado-Ortolani] : negative Machado-Ortolani [Allis Sign] : negative Allis sign [Symmetric Buttocks Creases] : symmetric buttocks creases [Spinal Dimple] : no spinal dimple [Tuft of Hair] : no tuft of hair [Plantar Grasp] : plantar grasp reflex present [Cranial Nerves Grossly Intact] : cranial nerves grossly intact [Rash or Lesions] : no rash/lesions

## 2025-04-28 NOTE — DEVELOPMENTAL MILESTONES
[Normal Development] : Normal Development [None] : none [Pats or smiles at reflection] : pats or smiles at reflection [Begins to turn when name called] : does not begin to turn when name called [Babbles] : babbles [Rolls over prone to supine] : rolls over prone to supine [Sits briefly without support] : sits briefly without support [Reaches for object and transfers] : reaches for object and transfers [Brewster small object on surface] : bangs small object on surface [FreeTextEntry1] : inconsistently responding to name.

## 2025-04-28 NOTE — DISCUSSION/SUMMARY
[FreeTextEntry1] : 6 month old female here for WCC.   WCC - Appropriate growth & Development for age - continue ad inder feeds, return for feeding intolerance - Counseled on introducing solids - one new food per 2-3 days to monitor for reaction. - Encouraged to introduce high allergen foods such as PB, Shellfish, eggs, dairy in next few months. - Incorporate fluorinated water daily in a sippy cup. When teeth erupt wipe daily with washcloth. - continue safe sleep practice - No toys, stuffed, animals, heavy blankets or bumpers. Lower crib mattress - Ensure home is safe since baby is now more mobile. Do not use infant walker. Read aloud to baby. - Reviewed anticipatory guidance re: fevers, car seat safety - Vaccines given: Rotavirus, Pentacel & Prevnar - Return for routine 9mo WCC

## 2025-05-08 NOTE — HISTORY OF PRESENT ILLNESS
[FreeTextEntry1] : Miya is a 6-month-old baby girl who is brought in today by her parents for pediatric orthopedic evaluation regarding her hips. She was born at 39 weeks' gestation via normal spontaneous vaginal delivery, weighing 8 pounds 2 ounces. Upon birth, a hip click on the right side was noted and the child was sent for an ultrasound.  The ultrasound was initially performed on November 1 when the baby was a few days old.  The parents were told that right hip dysplasia was noted but it could be due to the patient's young age/immaturity of the hip and was recommended to repeat an ultrasound a few weeks later.  A repeat ultrasound was performed on December 12, 2024, which again demonstrated hip dysplasia, this time in both hips.  They were recommended for pediatric orthopedic evaluation. She was initially seen on 12/26/2024 and she was placed in a [place] harness.   On 04/03/2025, recommended for discontinuation of [place] harness. Today father reports that she is doing well. Denies any discomfort or pain.  She is the first-born child for this family.  She presents today for repeat exam. Of note mother had h/o hip dysplasia as an infant and required bracing.

## 2025-05-08 NOTE — PHYSICAL EXAM
[FreeTextEntry1] : Well-developed, well-nourished 6-month-old F in no acute distress.  She is awake and alert and appears to be resting comfortably. She cries appropriately.  The head is normocephalic, atraumatic with full range of motion of the cervical spine with no pain.  Eyes are clear with no sclera abnormalities. Ears, nose and mouth are clear.   The child is moving all limbs spontaneously.  Full range of motion of bilateral upper extremities.  The motor exam is 5/5 of bilateral shoulders, elbows, wrists, and hands.  The pulses are 2+ at both wrists.   The child has full range of motion of bilateral hips, knees with motor exam of 5/5 of both lower extremities. No apparent limb length discrepancy.  Sensation is grossly intact in bilateral upper and lower extremities. Pulses are 2+ at both feet.

## 2025-05-08 NOTE — ASSESSMENT
[FreeTextEntry1] : 6-month-old baby girl with right hip dislocation/DDH, previously treated with [place] harness.  Today's assessment was performed with the assistance of the patient's parent as an independent historian, as the patient's history is unreliable. Clinical findings and imaging were discussed at length with parents. AP, frog pelvis radiographs were ordered, obtained, and independently reviewed in the clinic on 5/8/25, depicting depicting the bilateral femoral heads well seated within the acetabulum with the appropriate coverage. Acetabular angles: Right 24, left 21. Femoral ossification centers:  the right is smaller than the left  Based on her radiograph findings, the recommendation at this time would be observation. No orthopedic intervention needed at this time. She will f/u in 3 months for AP/Frog Pelvis x-rays and repeat clinical evaluation.   All questions answered. Parents verbalize their understanding of the plan.   ILanette, have acted as scribe and documented the above for Dr. Nicholas

## 2025-05-08 NOTE — DATA REVIEWED
[de-identified] : AP, frog pelvis radiographs were ordered, obtained, and independently reviewed in the clinic on 5/8/25, depicting the bilateral femoral heads well seated within the acetabulum with the appropriate coverage. Acetabular angles: Right 24, left 21. Femoral ossification centers:  the right is smaller than the left and seated inferior to Hilgen Weston lines and medial to Perkin lines. Shenton's line is normal. There are no signs of avascular necrosis.   US of both hips were obtained from [facility] on 04/03/2025 and independently reviewed today the right alpha angle measures 67 degrees and the right capital femoral epiphysis is approximately 60 % covered by the bony acetabulum. The left alpha angle measures 65 degrees and the left capital femoral epiphysis is approximately 70 % covered by the bony acetabulum.  US hips 3/13/25:  The right alpha angle measures 63 degrees and the right capital femoral epiphysis is approximately 66 % covered by the bony acetabulum.  The left alpha angle measures 65 degrees and the left capital femoral epiphysis is approximately 61 % covered by the bony acetabulum.  US of hips:1/16/2025 The left femoral head is approximately 50% covered by the bony acetabulum. The left alpha angle measures 60 degrees. The right femoral head is approximately 30% covered by the bony acetabulum. The right alpha angle measures 47 degrees.  US of hips at [facility] 12/26/24 FINDINGS: The left femoral head is approximately 47% covered by the bony acetabulum. The left alpha angle measures 60 degrees. The right femoral head is only approximately 20% covered by the bony acetabulum. The right acetabulum is shallow and the right alpha angle measures 46 degrees.  IMPRESSION: Bilateral hip dysplasia, right more severely affected than left. Orthopedic surgery evaluation is advised.  US of hips at [facility] 11/01/24 FINDINGS: The right alpha angle measures 55 degrees and the right capital femoral epiphysis is approximately 28  % covered by the bony acetabulum. The left alpha angle measures 58 degrees and the left capital femoral epiphysis is approximately 45 % covered by the bony acetabulum. The lateral margins of both acetabula are mildly rounded and there is absence of the normal concave contour.  IMPRESSION  mildly immature appearing hips likely related to the patient's young age.  A follow-up ultrasound in 4 weeks is recommended to document normal evolution of these hips and completely exclude dysplasia.

## 2025-07-30 NOTE — HISTORY OF PRESENT ILLNESS
[Parents] : parents [Breast milk] : breast milk [Formula ___ oz/feed] : [unfilled] oz of formula per feed [Fruit] : fruit [Vegetables] : vegetables [Eggs] : eggs [Peanut] : peanut [Dairy] : dairy [Normal] : Normal [In Crib] : sleeps in crib [Sippy Cup use] : sippy cup use [de-identified] : Mostly breastfeeding. ... eating more solids, using sippy cup for water [de-identified] : HOME

## 2025-07-30 NOTE — DEVELOPMENTAL MILESTONES
[Normal Development] : Normal Development [None] : none [Uses basic gestures] : uses basic gestures [Says "Marcus" or "Mama"] : says "Marcus" or "Mama" nonspecifically [Sits well without support] : sits well without support [Transitions between sitting and lying] : transitions between sitting and lying [Crawls] : crawls [Picks up small objects with 3 fingers] : picks up small objects with 3 fingers and thumb [Maxwell objects together] : bangs objects together